# Patient Record
Sex: MALE | Race: WHITE | Employment: PART TIME | URBAN - NONMETROPOLITAN AREA
[De-identification: names, ages, dates, MRNs, and addresses within clinical notes are randomized per-mention and may not be internally consistent; named-entity substitution may affect disease eponyms.]

---

## 2019-11-08 ENCOUNTER — HOSPITAL ENCOUNTER (OUTPATIENT)
Age: 29
Discharge: HOME | End: 2019-11-08
Payer: OTHER GOVERNMENT

## 2019-11-08 DIAGNOSIS — Z00.00 WELLNESS EXAMINATION: ICD-10-CM

## 2019-11-08 LAB
ALBUMIN SERPL-MCNC: 4.3 G/DL (ref 3.5–5.2)
ALBUMIN/GLOBULIN RATIO: 1.5 (ref 1–2.5)
ALP BLD-CCNC: 47 U/L (ref 40–129)
ALT SERPL-CCNC: 8 U/L (ref 5–41)
ANION GAP SERPL CALCULATED.3IONS-SCNC: 9 MMOL/L (ref 9–17)
AST SERPL-CCNC: 15 U/L
BILIRUB SERPL-MCNC: 0.41 MG/DL (ref 0.3–1.2)
BUN BLDV-MCNC: 13 MG/DL (ref 6–20)
BUN/CREAT BLD: 13 (ref 9–20)
CALCIUM SERPL-MCNC: 9.6 MG/DL (ref 8.6–10.4)
CHLORIDE BLD-SCNC: 100 MMOL/L (ref 98–107)
CHOLESTEROL/HDL RATIO: 3.5
CHOLESTEROL: 144 MG/DL
CO2: 28 MMOL/L (ref 20–31)
CREAT SERPL-MCNC: 1 MG/DL (ref 0.7–1.2)
ESTIMATED AVERAGE GLUCOSE: 91 MG/DL
GFR AFRICAN AMERICAN: >60 ML/MIN
GFR NON-AFRICAN AMERICAN: >60 ML/MIN
GFR SERPL CREATININE-BSD FRML MDRD: NORMAL ML/MIN/{1.73_M2}
GFR SERPL CREATININE-BSD FRML MDRD: NORMAL ML/MIN/{1.73_M2}
GLUCOSE BLD-MCNC: 91 MG/DL (ref 70–99)
HBA1C MFR BLD: 4.8 % (ref 4.8–5.9)
HCT VFR BLD CALC: 43.6 % (ref 40.7–50.3)
HDLC SERPL-MCNC: 41 MG/DL
HEMOGLOBIN: 14.4 G/DL (ref 13–17)
LDL CHOLESTEROL: 85 MG/DL (ref 0–130)
MCH RBC QN AUTO: 27.8 PG (ref 25.2–33.5)
MCHC RBC AUTO-ENTMCNC: 33 G/DL (ref 28.4–34.8)
MCV RBC AUTO: 84.2 FL (ref 82.6–102.9)
NRBC AUTOMATED: 0 PER 100 WBC
PDW BLD-RTO: 12.4 % (ref 11.8–14.4)
PLATELET # BLD: 156 K/UL (ref 138–453)
PMV BLD AUTO: 12.1 FL (ref 8.1–13.5)
POTASSIUM SERPL-SCNC: 4.5 MMOL/L (ref 3.7–5.3)
RBC # BLD: 5.18 M/UL (ref 4.21–5.77)
SODIUM BLD-SCNC: 137 MMOL/L (ref 135–144)
TOTAL PROTEIN: 7.1 G/DL (ref 6.4–8.3)
TRIGL SERPL-MCNC: 92 MG/DL
VLDLC SERPL CALC-MCNC: NORMAL MG/DL (ref 1–30)
WBC # BLD: 3.8 K/UL (ref 3.5–11.3)

## 2019-11-08 PROCEDURE — 80053 COMPREHEN METABOLIC PANEL: CPT

## 2019-11-08 PROCEDURE — 85027 COMPLETE CBC AUTOMATED: CPT

## 2019-11-08 PROCEDURE — 83036 HEMOGLOBIN GLYCOSYLATED A1C: CPT

## 2019-11-08 PROCEDURE — 36415 COLL VENOUS BLD VENIPUNCTURE: CPT

## 2019-11-08 PROCEDURE — 80061 LIPID PANEL: CPT

## 2019-11-20 ENCOUNTER — OFFICE VISIT (OUTPATIENT)
Dept: UROLOGY | Age: 29
End: 2019-11-20
Payer: OTHER GOVERNMENT

## 2019-11-20 ENCOUNTER — HOSPITAL ENCOUNTER (OUTPATIENT)
Age: 29
Setting detail: SPECIMEN
Discharge: HOME | End: 2019-11-20
Payer: OTHER GOVERNMENT

## 2019-11-20 VITALS
HEIGHT: 72 IN | BODY MASS INDEX: 27.09 KG/M2 | SYSTOLIC BLOOD PRESSURE: 120 MMHG | WEIGHT: 200 LBS | DIASTOLIC BLOOD PRESSURE: 76 MMHG

## 2019-11-20 DIAGNOSIS — N48.9 LESION OF PENIS: ICD-10-CM

## 2019-11-20 DIAGNOSIS — N48.9 LESION OF PENIS: Primary | ICD-10-CM

## 2019-11-20 LAB
-: ABNORMAL
AMORPHOUS: ABNORMAL
BACTERIA: ABNORMAL
BILIRUBIN URINE: NEGATIVE
CASTS UA: ABNORMAL /LPF
COLOR: YELLOW
COMMENT UA: ABNORMAL
CRYSTALS, UA: ABNORMAL /HPF
EPITHELIAL CELLS UA: ABNORMAL /HPF (ref 0–5)
GLUCOSE URINE: NEGATIVE
KETONES, URINE: NEGATIVE
LEUKOCYTE ESTERASE, URINE: NEGATIVE
MUCUS: ABNORMAL
NITRITE, URINE: NEGATIVE
OTHER OBSERVATIONS UA: ABNORMAL
PH UA: 7.5 (ref 5–9)
PROTEIN UA: NEGATIVE
RBC UA: ABNORMAL /HPF (ref 0–2)
RENAL EPITHELIAL, UA: ABNORMAL /HPF
SPECIFIC GRAVITY UA: 1.01 (ref 1.01–1.02)
TRICHOMONAS: ABNORMAL
TURBIDITY: CLEAR
URINE HGB: NEGATIVE
UROBILINOGEN, URINE: NORMAL
WBC UA: ABNORMAL /HPF (ref 0–5)
YEAST: ABNORMAL

## 2019-11-20 PROCEDURE — 87086 URINE CULTURE/COLONY COUNT: CPT

## 2019-11-20 PROCEDURE — 99244 OFF/OP CNSLTJ NEW/EST MOD 40: CPT | Performed by: NURSE PRACTITIONER

## 2019-11-20 PROCEDURE — 81001 URINALYSIS AUTO W/SCOPE: CPT

## 2019-11-20 ASSESSMENT — ENCOUNTER SYMPTOMS
VOMITING: 0
COUGH: 0
WHEEZING: 0
BACK PAIN: 0
EYE REDNESS: 0
CONSTIPATION: 0
SHORTNESS OF BREATH: 0
EYE PAIN: 0
NAUSEA: 0
ABDOMINAL PAIN: 0
COLOR CHANGE: 0

## 2019-11-22 ENCOUNTER — TELEPHONE (OUTPATIENT)
Dept: UROLOGY | Age: 29
End: 2019-11-22

## 2019-11-22 LAB
CULTURE: NO GROWTH
Lab: NORMAL
SPECIMEN DESCRIPTION: NORMAL

## 2019-12-02 ENCOUNTER — ANESTHESIA EVENT (OUTPATIENT)
Dept: OPERATING ROOM | Age: 29
End: 2019-12-02
Payer: OTHER GOVERNMENT

## 2019-12-02 PROBLEM — A63.0 CONDYLOMA: Status: ACTIVE | Noted: 2019-12-02

## 2019-12-02 NOTE — H&P
History and Physical    Patient:  Ayo Emerson  MRN: [id number]    CHIEF COMPLAINT: Genital lesions    HISTORY OF PRESENT ILLNESS:   The patient is a 34 y.o. male who presents with genital lesions. New patient referral from Merari CARLISLE for penile lesions. He has had these lesions for several years. There is no discharge, itching, pain or change in the lesions. He has had new lesions develop. Visions are only to the shaft of his penis. He is . His wife is currently pregnant with their first child. He claims that his wife has the same lesions and her gynecologist said they are nothing to worry about. He does not know if he was vaccinated against HPV. He denies any lower urinary tract symptoms. Past Medical History:    History reviewed. No pertinent past medical history. Past Surgical History:    Past Surgical History:   Procedure Laterality Date    HERNIA REPAIR  2012       Medications Prior to Admission:    Prior to Admission medications    Medication Sig Start Date End Date Taking?  Authorizing Provider   fluticasone (FLONASE) 50 MCG/ACT nasal spray 1 spray by Each Nostril route 2 times daily as needed for Rhinitis 11/5/19   MAYLIN Dobbins - CNP   cetirizine (ZYRTEC ALLERGY) 10 MG tablet Take 10 mg by mouth daily Indications: takes PRN when needed in allergy season    Historical Provider, MD       Allergies:  Augmentin [amoxicillin-pot clavulanate]    Social History:    Social History     Socioeconomic History    Marital status:      Spouse name: Not on file    Number of children: Not on file    Years of education: Not on file    Highest education level: Not on file   Occupational History    Not on file   Social Needs    Financial resource strain: Not on file    Food insecurity:     Worry: Not on file     Inability: Not on file    Transportation needs:     Medical: Not on file     Non-medical: Not on file   Tobacco Use    Smoking status: Never Smoker    Smokeless tobacco: Former User     Types: Chew   Substance and Sexual Activity    Alcohol use: Yes     Comment: occasional    Drug use: No    Sexual activity: Not on file   Lifestyle    Physical activity:     Days per week: Not on file     Minutes per session: Not on file    Stress: Not on file   Relationships    Social connections:     Talks on phone: Not on file     Gets together: Not on file     Attends [employer] service: Not on file     Active member of club or organization: Not on file     Attends meetings of clubs or organizations: Not on file     Relationship status: Not on file    Intimate partner violence:     Fear of current or ex partner: Not on file     Emotionally abused: Not on file     Physically abused: Not on file     Forced sexual activity: Not on file   Other Topics Concern    Not on file   Social History Narrative    Not on file       Family History:    Family History   Problem Relation Age of Onset    No Known Problems Mother     Elevated Lipids Father     No Known Problems Sister     No Known Problems Brother        REVIEW OF SYSTEMS:  All systems reviewed and negative except for that already noted in the HPI. Physical Exam:      No data found. Constitutional: Patient in no acute distress; Neuro: alert and oriented to person place and time. Psych: Mood and affect normal.  Skin: Normal  Lungs: Respiratory effort normal  Cardiovascular:  Normal peripheral pulses  Abdomen: Soft, non-tender, non-distended with no CVA, flank pain, hepatosplenomegaly or hernia. Kidneys normal.  Bladder non-tender and not distended. Lymphatics: no palpable lymphadenopathy  Penis normal and with lesions  Urethral meatus normal  Scrotal exam normal  Testicles normal bilaterally  Epididymis normal bilaterally  No evidence of inguinal hernia    LABS:   No results for input(s): WBC, HGB, HCT, MCV, PLT in the last 72 hours.   No results for input(s): NA, K, CL, CO2, PHOS, BUN, CREATININE in the last

## 2019-12-03 ENCOUNTER — ANESTHESIA (OUTPATIENT)
Dept: OPERATING ROOM | Age: 29
End: 2019-12-03
Payer: OTHER GOVERNMENT

## 2020-01-28 ENCOUNTER — HOSPITAL ENCOUNTER (OUTPATIENT)
Age: 30
Setting detail: OUTPATIENT SURGERY
Discharge: HOME | End: 2020-01-28
Attending: UROLOGY | Admitting: UROLOGY
Payer: OTHER GOVERNMENT

## 2020-01-28 VITALS — DIASTOLIC BLOOD PRESSURE: 52 MMHG | SYSTOLIC BLOOD PRESSURE: 99 MMHG | OXYGEN SATURATION: 97 % | TEMPERATURE: 98.6 F

## 2020-01-28 VITALS
RESPIRATION RATE: 18 BRPM | TEMPERATURE: 97.5 F | SYSTOLIC BLOOD PRESSURE: 98 MMHG | OXYGEN SATURATION: 97 % | HEART RATE: 44 BPM | HEIGHT: 72 IN | WEIGHT: 200 LBS | DIASTOLIC BLOOD PRESSURE: 59 MMHG | BODY MASS INDEX: 27.09 KG/M2

## 2020-01-28 PROCEDURE — 2709999900 HC NON-CHARGEABLE SUPPLY: Performed by: UROLOGY

## 2020-01-28 PROCEDURE — 3600000003 HC SURGERY LEVEL 3 BASE: Performed by: UROLOGY

## 2020-01-28 PROCEDURE — 3600000013 HC SURGERY LEVEL 3 ADDTL 15MIN: Performed by: UROLOGY

## 2020-01-28 PROCEDURE — 3700000000 HC ANESTHESIA ATTENDED CARE: Performed by: UROLOGY

## 2020-01-28 PROCEDURE — 2500000003 HC RX 250 WO HCPCS: Performed by: NURSE ANESTHETIST, CERTIFIED REGISTERED

## 2020-01-28 PROCEDURE — 6360000002 HC RX W HCPCS: Performed by: NURSE ANESTHETIST, CERTIFIED REGISTERED

## 2020-01-28 PROCEDURE — 6370000000 HC RX 637 (ALT 250 FOR IP): Performed by: UROLOGY

## 2020-01-28 PROCEDURE — 7100000010 HC PHASE II RECOVERY - FIRST 15 MIN: Performed by: UROLOGY

## 2020-01-28 PROCEDURE — 2500000003 HC RX 250 WO HCPCS: Performed by: UROLOGY

## 2020-01-28 PROCEDURE — 2580000003 HC RX 258: Performed by: NURSE ANESTHETIST, CERTIFIED REGISTERED

## 2020-01-28 PROCEDURE — 7100000011 HC PHASE II RECOVERY - ADDTL 15 MIN: Performed by: UROLOGY

## 2020-01-28 PROCEDURE — 3700000001 HC ADD 15 MINUTES (ANESTHESIA): Performed by: UROLOGY

## 2020-01-28 PROCEDURE — 6360000002 HC RX W HCPCS: Performed by: UROLOGY

## 2020-01-28 PROCEDURE — 88305 TISSUE EXAM BY PATHOLOGIST: CPT

## 2020-01-28 PROCEDURE — 2580000003 HC RX 258: Performed by: UROLOGY

## 2020-01-28 RX ORDER — PROMETHAZINE HYDROCHLORIDE 25 MG/ML
6.25 INJECTION, SOLUTION INTRAMUSCULAR; INTRAVENOUS
Status: DISCONTINUED | OUTPATIENT
Start: 2020-01-28 | End: 2020-01-28 | Stop reason: HOSPADM

## 2020-01-28 RX ORDER — SODIUM CHLORIDE 0.9 % (FLUSH) 0.9 %
10 SYRINGE (ML) INJECTION EVERY 12 HOURS SCHEDULED
Status: DISCONTINUED | OUTPATIENT
Start: 2020-01-28 | End: 2020-01-28 | Stop reason: SDUPTHER

## 2020-01-28 RX ORDER — SODIUM CHLORIDE 0.9 % (FLUSH) 0.9 %
10 SYRINGE (ML) INJECTION EVERY 12 HOURS SCHEDULED
Status: DISCONTINUED | OUTPATIENT
Start: 2020-01-28 | End: 2020-01-28 | Stop reason: HOSPADM

## 2020-01-28 RX ORDER — BACITRACIN, NEOMYCIN, POLYMYXIN B 400; 3.5; 5 [USP'U]/G; MG/G; [USP'U]/G
OINTMENT TOPICAL PRN
Status: DISCONTINUED | OUTPATIENT
Start: 2020-01-28 | End: 2020-01-28 | Stop reason: ALTCHOICE

## 2020-01-28 RX ORDER — HYDROCODONE BITARTRATE AND ACETAMINOPHEN 5; 325 MG/1; MG/1
2 TABLET ORAL
Status: DISCONTINUED | OUTPATIENT
Start: 2020-01-28 | End: 2020-01-28 | Stop reason: HOSPADM

## 2020-01-28 RX ORDER — MIDAZOLAM HYDROCHLORIDE 1 MG/ML
INJECTION INTRAMUSCULAR; INTRAVENOUS PRN
Status: DISCONTINUED | OUTPATIENT
Start: 2020-01-28 | End: 2020-01-28 | Stop reason: SDUPTHER

## 2020-01-28 RX ORDER — LIDOCAINE HYDROCHLORIDE 20 MG/ML
INJECTION, SOLUTION EPIDURAL; INFILTRATION; INTRACAUDAL; PERINEURAL PRN
Status: DISCONTINUED | OUTPATIENT
Start: 2020-01-28 | End: 2020-01-28 | Stop reason: SDUPTHER

## 2020-01-28 RX ORDER — CEFAZOLIN SODIUM 2 G/50ML
2 SOLUTION INTRAVENOUS
Status: COMPLETED | OUTPATIENT
Start: 2020-01-28 | End: 2020-01-28

## 2020-01-28 RX ORDER — SODIUM CHLORIDE, SODIUM LACTATE, POTASSIUM CHLORIDE, CALCIUM CHLORIDE 600; 310; 30; 20 MG/100ML; MG/100ML; MG/100ML; MG/100ML
INJECTION, SOLUTION INTRAVENOUS CONTINUOUS
Status: DISCONTINUED | OUTPATIENT
Start: 2020-01-28 | End: 2020-01-28 | Stop reason: SDUPTHER

## 2020-01-28 RX ORDER — DIMENHYDRINATE 50 MG/1
50 TABLET ORAL ONCE
Status: DISCONTINUED | OUTPATIENT
Start: 2020-01-28 | End: 2020-01-28 | Stop reason: SDUPTHER

## 2020-01-28 RX ORDER — METOCLOPRAMIDE HYDROCHLORIDE 5 MG/ML
10 INJECTION INTRAMUSCULAR; INTRAVENOUS
Status: DISCONTINUED | OUTPATIENT
Start: 2020-01-28 | End: 2020-01-28 | Stop reason: HOSPADM

## 2020-01-28 RX ORDER — SODIUM CHLORIDE, SODIUM LACTATE, POTASSIUM CHLORIDE, CALCIUM CHLORIDE 600; 310; 30; 20 MG/100ML; MG/100ML; MG/100ML; MG/100ML
INJECTION, SOLUTION INTRAVENOUS CONTINUOUS
Status: DISCONTINUED | OUTPATIENT
Start: 2020-01-28 | End: 2020-01-28 | Stop reason: HOSPADM

## 2020-01-28 RX ORDER — PROPOFOL 10 MG/ML
INJECTION, EMULSION INTRAVENOUS PRN
Status: DISCONTINUED | OUTPATIENT
Start: 2020-01-28 | End: 2020-01-28 | Stop reason: SDUPTHER

## 2020-01-28 RX ORDER — SODIUM CHLORIDE 0.9 % (FLUSH) 0.9 %
10 SYRINGE (ML) INJECTION PRN
Status: DISCONTINUED | OUTPATIENT
Start: 2020-01-28 | End: 2020-01-28 | Stop reason: HOSPADM

## 2020-01-28 RX ORDER — LIDOCAINE HYDROCHLORIDE 10 MG/ML
INJECTION, SOLUTION EPIDURAL; INFILTRATION; INTRACAUDAL; PERINEURAL PRN
Status: DISCONTINUED | OUTPATIENT
Start: 2020-01-28 | End: 2020-01-28 | Stop reason: ALTCHOICE

## 2020-01-28 RX ORDER — SODIUM CHLORIDE, SODIUM LACTATE, POTASSIUM CHLORIDE, CALCIUM CHLORIDE 600; 310; 30; 20 MG/100ML; MG/100ML; MG/100ML; MG/100ML
INJECTION, SOLUTION INTRAVENOUS CONTINUOUS PRN
Status: DISCONTINUED | OUTPATIENT
Start: 2020-01-28 | End: 2020-01-28 | Stop reason: SDUPTHER

## 2020-01-28 RX ORDER — DIMENHYDRINATE 50 MG/1
50 TABLET ORAL ONCE
Status: COMPLETED | OUTPATIENT
Start: 2020-01-28 | End: 2020-01-28

## 2020-01-28 RX ORDER — FENTANYL CITRATE 50 UG/ML
50 INJECTION, SOLUTION INTRAMUSCULAR; INTRAVENOUS EVERY 5 MIN PRN
Status: DISCONTINUED | OUTPATIENT
Start: 2020-01-28 | End: 2020-01-28 | Stop reason: HOSPADM

## 2020-01-28 RX ORDER — ACETAMINOPHEN 325 MG/1
650 TABLET ORAL ONCE
Status: COMPLETED | OUTPATIENT
Start: 2020-01-28 | End: 2020-01-28

## 2020-01-28 RX ORDER — KETAMINE HYDROCHLORIDE 100 MG/ML
INJECTION, SOLUTION INTRAMUSCULAR; INTRAVENOUS PRN
Status: DISCONTINUED | OUTPATIENT
Start: 2020-01-28 | End: 2020-01-28 | Stop reason: SDUPTHER

## 2020-01-28 RX ORDER — ACETAMINOPHEN 325 MG/1
650 TABLET ORAL ONCE
Status: DISCONTINUED | OUTPATIENT
Start: 2020-01-28 | End: 2020-01-28 | Stop reason: SDUPTHER

## 2020-01-28 RX ADMIN — KETAMINE HYDROCHLORIDE 5 MG: 100 INJECTION INTRAMUSCULAR; INTRAVENOUS at 07:24

## 2020-01-28 RX ADMIN — PROPOFOL 30 MG: 10 INJECTION, EMULSION INTRAVENOUS at 07:24

## 2020-01-28 RX ADMIN — PROPOFOL 30 MG: 10 INJECTION, EMULSION INTRAVENOUS at 07:35

## 2020-01-28 RX ADMIN — LIDOCAINE HYDROCHLORIDE 100 MG: 20 INJECTION, SOLUTION EPIDURAL; INFILTRATION; INTRACAUDAL at 07:21

## 2020-01-28 RX ADMIN — PROPOFOL 30 MG: 10 INJECTION, EMULSION INTRAVENOUS at 07:28

## 2020-01-28 RX ADMIN — SODIUM CHLORIDE, POTASSIUM CHLORIDE, SODIUM LACTATE AND CALCIUM CHLORIDE: 600; 310; 30; 20 INJECTION, SOLUTION INTRAVENOUS at 07:16

## 2020-01-28 RX ADMIN — DIMENHYDRINATE 50 MG: 50 TABLET ORAL at 06:59

## 2020-01-28 RX ADMIN — MIDAZOLAM 2 MG: 1 INJECTION INTRAMUSCULAR; INTRAVENOUS at 07:17

## 2020-01-28 RX ADMIN — SODIUM CHLORIDE, POTASSIUM CHLORIDE, SODIUM LACTATE AND CALCIUM CHLORIDE: 600; 310; 30; 20 INJECTION, SOLUTION INTRAVENOUS at 06:58

## 2020-01-28 RX ADMIN — PROPOFOL 50 MG: 10 INJECTION, EMULSION INTRAVENOUS at 07:21

## 2020-01-28 RX ADMIN — ACETAMINOPHEN 650 MG: 325 TABLET, FILM COATED ORAL at 06:59

## 2020-01-28 RX ADMIN — CEFAZOLIN SODIUM 2 G: 2 SOLUTION INTRAVENOUS at 07:15

## 2020-01-28 RX ADMIN — KETAMINE HYDROCHLORIDE 10 MG: 100 INJECTION INTRAMUSCULAR; INTRAVENOUS at 07:20

## 2020-01-28 RX ADMIN — MIDAZOLAM 2 MG: 1 INJECTION INTRAMUSCULAR; INTRAVENOUS at 07:12

## 2020-01-28 ASSESSMENT — PAIN SCALES - GENERAL
PAINLEVEL_OUTOF10: 0

## 2020-01-28 ASSESSMENT — PULMONARY FUNCTION TESTS
PIF_VALUE: 0
PIF_VALUE: 1
PIF_VALUE: 0
PIF_VALUE: 1
PIF_VALUE: 1
PIF_VALUE: 0
PIF_VALUE: 1
PIF_VALUE: 0
PIF_VALUE: 0
PIF_VALUE: 1
PIF_VALUE: 0
PIF_VALUE: 1
PIF_VALUE: 0
PIF_VALUE: 1
PIF_VALUE: 1
PIF_VALUE: 0

## 2020-01-28 ASSESSMENT — PAIN - FUNCTIONAL ASSESSMENT: PAIN_FUNCTIONAL_ASSESSMENT: 0-10

## 2020-01-28 NOTE — INTERVAL H&P NOTE
needs:       Medical: Not on file       Non-medical: Not on file   Tobacco Use    Smoking status: Never Smoker    Smokeless tobacco: Former User       Types: Chew   Substance and Sexual Activity    Alcohol use: Yes       Comment: occasional    Drug use: No    Sexual activity: Not on file   Lifestyle    Physical activity:       Days per week: Not on file       Minutes per session: Not on file    Stress: Not on file   Relationships    Social connections:       Talks on phone: Not on file       Gets together: Not on file       Attends Tenriism service: Not on file       Active member of club or organization: Not on file       Attends meetings of clubs or organizations: Not on file       Relationship status: Not on file    Intimate partner violence:       Fear of current or ex partner: Not on file       Emotionally abused: Not on file       Physically abused: Not on file       Forced sexual activity: Not on file   Other Topics Concern    Not on file   Social History Narrative    Not on file            Family History:    Family History         Family History   Problem Relation Age of Onset    No Known Problems Mother      Elevated Lipids Father      No Known Problems Sister      No Known Problems Brother              REVIEW OF SYSTEMS:  All systems reviewed and negative except for that already noted in the HPI.     Physical Exam:       No data found. Constitutional: Patient in no acute distress; Neuro: alert and oriented to person place and time. Psych: Mood and affect normal.  Skin: Normal  Lungs: Respiratory effort normal  Cardiovascular:  Normal peripheral pulses  Abdomen: Soft, non-tender, non-distended with no CVA, flank pain, hepatosplenomegaly or hernia. Kidneys normal.  Bladder non-tender and not distended.   Lymphatics: no palpable lymphadenopathy  Penis normal and with lesions  Urethral meatus normal  Scrotal exam normal  Testicles normal bilaterally  Epididymis normal bilaterally  No evidence of inguinal hernia     LABS:   No results for input(s): WBC, HGB, HCT, MCV, PLT in the last 72 hours.   No results for input(s): NA, K, CL, CO2, PHOS, BUN, CREATININE in the last 72 hours.     Invalid input(s): CA  No results found for: PSA     Additional Lab/culture results:     Urinalysis: No results for input(s): COLORU, PHUR, LABCAST, WBCUA, RBCUA, MUCUS, TRICHOMONAS, YEAST, BACTERIA, CLARITYU, SPECGRAV, LEUKOCYTESUR, UROBILINOGEN, BILIRUBINUR, BLOODU in the last 72 hours.     Invalid input(s): NITRATE, GLUCOSEUKETONESUAMORPHOUS      -----------------------------------------------------------------  Imaging Results:     Assessment and Plan   Impression:  There is no problem list on file for this patient.        Plan: CO2 laser ablation of condyloma

## 2020-01-28 NOTE — ANESTHESIA PRE PROCEDURE
Laterality Date    HERNIA REPAIR  2012       Social History:    Social History     Tobacco Use    Smoking status: Never Smoker    Smokeless tobacco: Former User     Types: Chew   Substance Use Topics    Alcohol use: Yes     Comment: occasional                                Counseling given: Not Answered      Vital Signs (Current):   Vitals:    11/21/19 0917 01/16/20 0951 01/28/20 0643   BP:   126/74   Pulse:   61   Resp:   18   Temp:   36.2 °C (97.1 °F)   TempSrc:   Temporal   SpO2:   100%   Weight: 200 lb (90.7 kg) 200 lb (90.7 kg) 200 lb (90.7 kg)   Height: 6' (1.829 m) 6' (1.829 m) 6' (1.829 m)                                              BP Readings from Last 3 Encounters:   01/28/20 126/74   11/20/19 120/76   11/05/19 (!) 108/56       NPO Status: Time of last liquid consumption: 2330                        Time of last solid consumption: 2000                        Date of last liquid consumption: 01/27/20                        Date of last solid food consumption: 01/27/20    BMI:   Wt Readings from Last 3 Encounters:   01/28/20 200 lb (90.7 kg)   11/20/19 200 lb (90.7 kg)   11/05/19 191 lb (86.6 kg)     Body mass index is 27.12 kg/m². CBC:   Lab Results   Component Value Date    WBC 3.8 11/08/2019    RBC 5.18 11/08/2019    HGB 14.4 11/08/2019    HCT 43.6 11/08/2019    MCV 84.2 11/08/2019    RDW 12.4 11/08/2019     11/08/2019       CMP:   Lab Results   Component Value Date     11/08/2019    K 4.5 11/08/2019     11/08/2019    CO2 28 11/08/2019    BUN 13 11/08/2019    CREATININE 1.00 11/08/2019    GFRAA >60 11/08/2019    LABGLOM >60 11/08/2019    GLUCOSE 91 11/08/2019    PROT 7.1 11/08/2019    CALCIUM 9.6 11/08/2019    BILITOT 0.41 11/08/2019    ALKPHOS 47 11/08/2019    AST 15 11/08/2019    ALT 8 11/08/2019       POC Tests: No results for input(s): POCGLU, POCNA, POCK, POCCL, POCBUN, POCHEMO, POCHCT in the last 72 hours.     Coags: No results found for: PROTIME, INR, APTT    HCG (If

## 2020-01-28 NOTE — PROGRESS NOTES
0750 no drainage noted on penis. Discharge instructions given to pt and wife. 0850 small amt red drainage on penis. Area covered with telfa dressing. Discharge Criteria    Inpatients must meet Criteria 1 through 7. All other patients are either YES or N/A. If a NO is chosen then Anesthesia or Surgeon must be notified. 1.  Minimum 30 minutes after last dose of sedative medication, minimum 120 minutes after last dose of reversal agent. Yes      2. Systolic BP stable within 20 mmHg for 30 minutes & systolic BP between 90 & 527 or within 10 mmHg of baseline. Yes      3. Pulse between 60 and 100 or within 10 bpm of baseline. Yes      4. Spontaneous respiratory rate >/= 10 per minute. Yes      5. SaO2 >/= 95 or  >/= baseline. Yes      6. Able to cough and swallow or return to baseline function. Yes      7. Alert and oriented or return to baseline mental status. Yes      8. Demonstrates controlled, coordinated movements, ambulates with steady gait, or return to baseline activity function. Yes      9. Minimal or no pain or nausea, or at a level tolerable and acceptable to patient. Yes      10. Takes and retains oral fluids as allowed. Yes      11. Procedural / perioperative site stable. Minimal or no bleeding. Yes          12. If GI endoscopy procedure, minimal or no abdominal distention or passing flatus. N/A      13. Written discharge instructions and emergency telephone number provided. Yes      14. Accompanied by a responsible adult.     Yes      Adult patient discharged from facility without responsible person meets above criteria plus the following:   a) remains awake without stimulus for 30 minutes     b) oriented appropriate for age      c) all vital signs stable   d) no significant risk of losing protective reflexes      e) able to maintain pre-procedure mobility without assistance   f) no nausea or dizziness      g) transportation arrangements that do not require patient to operate motor Vehicle.      N/A

## 2020-01-28 NOTE — PROGRESS NOTES
3544 pt dressing to go home. States sleepy and has not slept much at home because of  at home. Ready to go home.

## 2020-01-29 LAB — SURGICAL PATHOLOGY REPORT: NORMAL

## 2020-01-29 NOTE — OP NOTE
[address], [place] [address]                                OPERATIVE REPORT    PATIENT NAME: Umu Blevins                   :        1990  MED REC NO:   [id number]                              ROOM:  ACCOUNT NO:   [de-identified]                           ADMIT DATE: 2020  PROVIDER:     Michell Quezada    DATE OF PROCEDURE:  2020    SURGEON:  Dr. Michell Quezada. ASSISTANT:  None. PREOPERATIVE DIAGNOSIS:  Penile lesion. POSTOPERATIVE DIAGNOSIS:  Penile lesion. PROCEDURE PERFORMED:  1. Excision and biopsy of penile lesion. 2.  CO2 laser ablation of penile condyloma. ANESTHESIA:  General.    COMPLICATIONS:  None. ESTIMATED BLOOD LOSS:  Minimal.    SPECIMENS:  Penile lesion. DISPOSITION:  Stable. FINDINGS:  Multipole 3 mm lesions on the dorsum of the mid shaft of the  penis. INDICATIONS:  The patient is a 66-year-old male with known penile  lesions, here now for biopsy and ablation. DESCRIPTION OF PROCEDURE:  The patient was taken back to the operating  room after informed consent including all risks, benefits, and  alternatives were obtained. The patient was transferred from the [facility]  onto the operative table where he was induced under general anesthesia,  given IV Ancef for preoperative antibiotic prophylaxis. To begin the  case, he was prepped and draped in normal sterile fashion, placed in the  supine position. To begin the case, we identified a lesion on the  dorsum of the penis. We were able to excise this using a 15 blade  scalpel. This was passed to the back table for permanent analysis. We  were then able to cauterize the area with Bovie cautery. At this point  in time, We introduced the CO2 laser and we then ablated the rest of the  lesions with the CO2 laser after the area was anesthetized with 1%  lidocaine. We did do this several time removing the eschar in between.

## 2020-02-05 ENCOUNTER — OFFICE VISIT (OUTPATIENT)
Dept: UROLOGY | Age: 30
End: 2020-02-05
Payer: OTHER GOVERNMENT

## 2020-02-05 VITALS — WEIGHT: 197 LBS | BODY MASS INDEX: 26.72 KG/M2 | SYSTOLIC BLOOD PRESSURE: 100 MMHG | DIASTOLIC BLOOD PRESSURE: 60 MMHG

## 2020-02-05 PROCEDURE — 99024 POSTOP FOLLOW-UP VISIT: CPT | Performed by: UROLOGY

## 2020-02-05 ASSESSMENT — ENCOUNTER SYMPTOMS
EYE REDNESS: 0
SHORTNESS OF BREATH: 0
WHEEZING: 0
NAUSEA: 0
COLOR CHANGE: 0
VOMITING: 0
COUGH: 0
BACK PAIN: 0
ABDOMINAL PAIN: 0
EYE PAIN: 0

## 2020-06-23 ENCOUNTER — HOSPITAL ENCOUNTER (OUTPATIENT)
Age: 30
Discharge: HOME | End: 2020-06-25
Payer: OTHER GOVERNMENT

## 2020-06-23 ENCOUNTER — HOSPITAL ENCOUNTER (OUTPATIENT)
Dept: GENERAL RADIOLOGY | Age: 30
Discharge: HOME | End: 2020-06-25
Payer: OTHER GOVERNMENT

## 2020-06-23 PROCEDURE — 73610 X-RAY EXAM OF ANKLE: CPT

## 2020-11-27 ENCOUNTER — HOSPITAL ENCOUNTER (OUTPATIENT)
Dept: LAB | Age: 30
Setting detail: SPECIMEN
Discharge: HOME | End: 2020-11-27
Payer: OTHER GOVERNMENT

## 2020-11-27 PROCEDURE — U0003 INFECTIOUS AGENT DETECTION BY NUCLEIC ACID (DNA OR RNA); SEVERE ACUTE RESPIRATORY SYNDROME CORONAVIRUS 2 (SARS-COV-2) (CORONAVIRUS DISEASE [COVID-19]), AMPLIFIED PROBE TECHNIQUE, MAKING USE OF HIGH THROUGHPUT TECHNOLOGIES AS DESCRIBED BY CMS-2020-01-R: HCPCS

## 2020-11-27 PROCEDURE — C9803 HOPD COVID-19 SPEC COLLECT: HCPCS

## 2020-11-29 LAB — SARS-COV-2, NAA: DETECTED

## 2020-11-30 ENCOUNTER — TELEPHONE (OUTPATIENT)
Dept: PRIMARY CARE CLINIC | Age: 30
End: 2020-11-30

## 2020-11-30 ENCOUNTER — TELEPHONE (OUTPATIENT)
Dept: ADMINISTRATIVE | Age: 30
End: 2020-11-30

## 2023-10-06 PROBLEM — J45.909 ASTHMA: Status: ACTIVE | Noted: 2023-10-06

## 2023-10-09 ENCOUNTER — HOSPITAL ENCOUNTER (OUTPATIENT)
Dept: GENERAL RADIOLOGY | Age: 33
Discharge: HOME | End: 2023-10-11
Payer: OTHER GOVERNMENT

## 2023-10-09 ENCOUNTER — HOSPITAL ENCOUNTER (OUTPATIENT)
Age: 33
Discharge: HOME | End: 2023-10-11
Payer: OTHER GOVERNMENT

## 2023-10-09 ENCOUNTER — HOSPITAL ENCOUNTER (OUTPATIENT)
Age: 33
Discharge: HOME | End: 2023-10-09
Payer: OTHER GOVERNMENT

## 2023-10-09 DIAGNOSIS — M25.511 CHRONIC RIGHT SHOULDER PAIN: ICD-10-CM

## 2023-10-09 DIAGNOSIS — K59.01 SLOW TRANSIT CONSTIPATION: ICD-10-CM

## 2023-10-09 DIAGNOSIS — G89.29 CHRONIC RIGHT SHOULDER PAIN: ICD-10-CM

## 2023-10-09 DIAGNOSIS — M54.6 THORACIC SPINE PAIN: ICD-10-CM

## 2023-10-09 LAB
ALBUMIN SERPL-MCNC: 4.7 G/DL (ref 3.5–5.2)
ALBUMIN/GLOB SERPL: 1.7 {RATIO} (ref 1–2.5)
ALP SERPL-CCNC: 52 U/L (ref 40–129)
ALT SERPL-CCNC: 22 U/L (ref 5–41)
ANION GAP SERPL CALCULATED.3IONS-SCNC: 9 MMOL/L (ref 9–17)
AST SERPL-CCNC: 25 U/L
BILIRUB SERPL-MCNC: 0.7 MG/DL (ref 0.3–1.2)
BUN SERPL-MCNC: 15 MG/DL (ref 6–20)
BUN/CREAT SERPL: 14 (ref 9–20)
CALCIUM SERPL-MCNC: 9.5 MG/DL (ref 8.6–10.4)
CHLORIDE SERPL-SCNC: 104 MMOL/L (ref 98–107)
CHOLEST SERPL-MCNC: 196 MG/DL (ref 0–199)
CHOLESTEROL/HDL RATIO: 5
CO2 SERPL-SCNC: 28 MMOL/L (ref 20–31)
CREAT SERPL-MCNC: 1.1 MG/DL (ref 0.7–1.2)
ERYTHROCYTE [DISTWIDTH] IN BLOOD BY AUTOMATED COUNT: 12.3 % (ref 11.8–14.4)
GFR SERPL CREATININE-BSD FRML MDRD: >60 ML/MIN/1.73M2
GLUCOSE SERPL-MCNC: 92 MG/DL (ref 70–99)
HCT VFR BLD AUTO: 46.1 % (ref 40.7–50.3)
HDLC SERPL-MCNC: 42 MG/DL
HGB BLD-MCNC: 15.3 G/DL (ref 13–17)
LDLC SERPL CALC-MCNC: 137 MG/DL (ref 0–100)
MCH RBC QN AUTO: 28.5 PG (ref 25.2–33.5)
MCHC RBC AUTO-ENTMCNC: 33.2 G/DL (ref 28.4–34.8)
MCV RBC AUTO: 85.8 FL (ref 82.6–102.9)
NRBC BLD-RTO: 0 PER 100 WBC
PLATELET # BLD AUTO: 145 K/UL (ref 138–453)
PMV BLD AUTO: 11.9 FL (ref 8.1–13.5)
POTASSIUM SERPL-SCNC: 4.5 MMOL/L (ref 3.7–5.3)
PROT SERPL-MCNC: 7.5 G/DL (ref 6.4–8.3)
RBC # BLD AUTO: 5.37 M/UL (ref 4.21–5.77)
SODIUM SERPL-SCNC: 141 MMOL/L (ref 135–144)
TRIGL SERPL-MCNC: 85 MG/DL (ref 0–149)
VLDLC SERPL CALC-MCNC: 17 MG/DL
WBC OTHER # BLD: 4.3 K/UL (ref 3.5–11.3)

## 2023-10-09 PROCEDURE — 36415 COLL VENOUS BLD VENIPUNCTURE: CPT

## 2023-10-09 PROCEDURE — 85027 COMPLETE CBC AUTOMATED: CPT

## 2023-10-09 PROCEDURE — 80061 LIPID PANEL: CPT

## 2023-10-09 PROCEDURE — 72072 X-RAY EXAM THORAC SPINE 3VWS: CPT

## 2023-10-09 PROCEDURE — 73030 X-RAY EXAM OF SHOULDER: CPT

## 2023-10-09 PROCEDURE — 80053 COMPREHEN METABOLIC PANEL: CPT

## 2023-12-04 ENCOUNTER — OFFICE VISIT (OUTPATIENT)
Dept: UROLOGY | Age: 33
End: 2023-12-04
Payer: OTHER GOVERNMENT

## 2023-12-04 VITALS
DIASTOLIC BLOOD PRESSURE: 76 MMHG | WEIGHT: 214 LBS | BODY MASS INDEX: 29.02 KG/M2 | SYSTOLIC BLOOD PRESSURE: 114 MMHG | HEART RATE: 76 BPM | TEMPERATURE: 98.9 F

## 2023-12-04 DIAGNOSIS — A63.0 CONDYLOMA: Primary | ICD-10-CM

## 2023-12-04 PROCEDURE — 99204 OFFICE O/P NEW MOD 45 MIN: CPT | Performed by: UROLOGY

## 2023-12-04 RX ORDER — SILDENAFIL 50 MG/1
TABLET, FILM COATED ORAL
COMMUNITY
Start: 2023-11-11

## 2023-12-04 ASSESSMENT — ENCOUNTER SYMPTOMS
COUGH: 0
ABDOMINAL PAIN: 0
SHORTNESS OF BREATH: 0
WHEEZING: 0
EYE REDNESS: 0
VOMITING: 0
BACK PAIN: 0
NAUSEA: 0
CONSTIPATION: 0
COLOR CHANGE: 0

## 2023-12-04 NOTE — PROGRESS NOTES
HPI:          Patient is a 35 y.o. male in no acute distress. He is alert and oriented to person, place, and time. History  New patient referral from Javy CARLISLE for penile lesions. He has had these lesions for several years. There is no discharge, itching, pain or change in the lesions. He has had new lesions develop. Visions are only to the shaft of his penis. He is . His wife is currently pregnant with their first child. He claims that his wife has the same lesions and her gynecologist said they are nothing to worry about. He does not know if he was vaccinated against HPV. He denies any lower urinary tract symptoms. 1/2020 CO2 laser ablation  Path condyloma accuminatum     Currently  Patient is here today for penile lesions. Patient does have a history of having CO2 laser ablation of condyloma approximately 3 years ago. Patient does report having a lesion for approximately 1 year. This is on the top of his penis. Patient is having no other significant lower urinary tract symptoms. He has no issues with constipation. Reports no pain today  Past Medical History:   Diagnosis Date    Chronic allergic rhinitis      Past Surgical History:   Procedure Laterality Date    HERNIA REPAIR  2012    PENIS BIOPSY      condyloma ablation    PENIS SURGERY N/A 01/28/2020    PENIS CONDYLOMA ABLATION LASER-CO2 LASER AND BIOPSY performed by Doris Padilla MD at [address] [facility] [address]       Outpatient Encounter Medications as of 12/4/2023   Medication Sig Dispense Refill    sildenafil (VIAGRA) 50 MG tablet       flunisolide (NASALIDE) 25 MCG/ACT (0.025%) SOLN 1 spray by Nasal route daily      montelukast (SINGULAIR) 10 MG tablet Take 1 tablet by mouth nightly      Azelastine HCl 137 MCG/SPRAY SOLN 1 spray by Each Nostril route daily       No facility-administered encounter medications on file as of 12/4/2023.       Current Outpatient Medications on File Prior to Visit   Medication Sig

## 2023-12-18 NOTE — PROGRESS NOTES
Patient instructed on the pre-operative, intra-operative, and post-operative process. Patient instructed on NPO status. Medication instructions and pre operative instruction sheet reviewed with the patient.

## 2024-01-01 NOTE — H&P
History and Physical    Patient:  Adilson Roy  MRN: [id number]    CHIEF COMPLAINT:  penile lesion    HISTORY OF PRESENT ILLNESS:   The patient is a 33 y.o. male who presents with penile lesion. History  New patient referral from KELVIN CARLISLE for penile lesions. He has had these lesions for several years. There is no discharge, itching, pain or change in the lesions.  He has had new lesions develop.  Visions are only to the shaft of his penis.  He is .  His wife is currently pregnant with their first child.  He claims that his wife has the same lesions and her gynecologist said they are nothing to worry about.  He does not know if he was vaccinated against HPV.  He denies any lower urinary tract symptoms.     1/2020 CO2 laser ablation  Path condyloma accuminatum     Currently  Patient is here today for penile lesions.  Patient does have a history of having CO2 laser ablation of condyloma approximately 3 years ago.  Patient does report having a lesion for approximately 1 year.  This is on the top of his penis.  Patient is having no other significant lower urinary tract symptoms.  He has no issues with constipation.  Reports no pain today    Past Medical History:    Past Medical History:   Diagnosis Date    Chronic allergic rhinitis        Past Surgical History:    Past Surgical History:   Procedure Laterality Date    HERNIA REPAIR  2012    PENIS BIOPSY      condyloma ablation    PENIS SURGERY N/A 01/28/2020    PENIS CONDYLOMA ABLATION LASER-CO2 LASER AND BIOPSY performed by Franky Bella MD at [facility] OR    RHINOPLASTY         Medications Prior to Admission:    Prior to Admission medications    Medication Sig Start Date End Date Taking? Authorizing Provider   sildenafil (VIAGRA) 50 MG tablet  11/11/23   Rhoda Mederos MD   flunisolide (NASALIDE) 25 MCG/ACT (0.025%) SOLN 1 spray by Nasal route daily 4/1/21   Rhoda Mederos MD   montelukast (SINGULAIR) 10 MG tablet Take 1 tablet by mouth

## 2024-01-02 ENCOUNTER — HOSPITAL ENCOUNTER (OUTPATIENT)
Age: 34
Setting detail: OUTPATIENT SURGERY
Discharge: HOME | End: 2024-01-02
Attending: UROLOGY | Admitting: UROLOGY
Payer: OTHER GOVERNMENT

## 2024-01-02 VITALS
RESPIRATION RATE: 18 BRPM | HEART RATE: 62 BPM | WEIGHT: 214 LBS | SYSTOLIC BLOOD PRESSURE: 134 MMHG | BODY MASS INDEX: 28.99 KG/M2 | DIASTOLIC BLOOD PRESSURE: 52 MMHG | OXYGEN SATURATION: 100 % | TEMPERATURE: 97 F | HEIGHT: 72 IN

## 2024-01-02 PROCEDURE — 2709999900 HC NON-CHARGEABLE SUPPLY: Performed by: UROLOGY

## 2024-01-02 PROCEDURE — 2500000003 HC RX 250 WO HCPCS: Performed by: UROLOGY

## 2024-01-02 PROCEDURE — 3600000003 HC SURGERY LEVEL 3 BASE: Performed by: UROLOGY

## 2024-01-02 PROCEDURE — 3600000013 HC SURGERY LEVEL 3 ADDTL 15MIN: Performed by: UROLOGY

## 2024-01-02 PROCEDURE — 6370000000 HC RX 637 (ALT 250 FOR IP): Performed by: UROLOGY

## 2024-01-02 RX ORDER — LIDOCAINE HYDROCHLORIDE 10 MG/ML
INJECTION, SOLUTION EPIDURAL; INFILTRATION; INTRACAUDAL; PERINEURAL PRN
Status: DISCONTINUED | OUTPATIENT
Start: 2024-01-02 | End: 2024-01-02 | Stop reason: ALTCHOICE

## 2024-01-02 RX ORDER — IBUPROFEN 200 MG
TABLET ORAL PRN
Status: DISCONTINUED | OUTPATIENT
Start: 2024-01-02 | End: 2024-01-02 | Stop reason: ALTCHOICE

## 2024-01-02 NOTE — DISCHARGE INSTRUCTIONS
1.  Apply Triple Antibiotic Ointment 3-4 times a day.    2.  Keep penis and pop area clean.    3.  Report the following signs or any questions regarding your physical condition to your surgeon immediately:         Excessive swelling of or around the wound area.     Redness.     Temperature of 100 degrees (F) or above.     Excessive pain.     Unable to urinate.    4.  Call your surgeon for any questions regarding your surgery.    5.  Call for an appointment to see your surgeon in  ________2 weeks_________.

## 2024-01-03 NOTE — OP NOTE
[address] [address]                                OPERATIVE REPORT    PATIENT NAME: JON MOORE                   :        1990  MED REC NO:   [id number]                              ROOM:  ACCOUNT NO:   [id number]                           ADMIT DATE: 2024  PROVIDER:     Franky Bella    DATE OF PROCEDURE:  2024    SURGEON:  Dr. Franky Bella.    ASSISTANT:  None.    PREOPERATIVE DIAGNOSIS:  Penile condyloma.    POSTOPERATIVE DIAGNOSIS:  Penile condyloma.    PROCEDURE PERFORMED:  CO2 laser ablation of penile condyloma.    ANESTHESIA:  Local.    COMPLICATIONS:  None.    ESTIMATED BLOOD LOSS:  Minimal.    SPECIMENS:  None.    DISPOSITION:  Stable.    FINDINGS:  1.  An 8-mm lesion near the base of the penis.  2.  Another 2- to 3-mm lesion on the dorsum of the penis near the  coronal sulcus.    INDICATIONS:  The patient is a 33-year-old male with _____ penile  condyloma, here now for definitive therapy.    DESCRIPTION OF PROCEDURE:  The patient was taken back to the operating  room after informed consent including all risks, benefits, and  alternatives were obtained.  The patient remained on his gurney, where  he was prepped and draped in normal sterile fashion.  To begin the case,  he had local anesthetic and placed 1% lidocaine plain in the lesions of  his penis.  Once these were anesthetized, we were able to then use a CO2  laser and ablate these lesions.  We did use 6 bucio continuous.  With  this setting, we ablated the proximal lesion first.  We were then able  to ablate the distal lesion.  We then used the moistened 4x4 and removed  the eschar.  Once this was done, we were able to then re-ablate these  lesions again with the CO2 laser.  Once this was done, we then dressed  the wounds with Bacitracin and he was undraped and returned to the  recovery area by the Nursing teams in satisfactory

## 2024-01-18 ENCOUNTER — OFFICE VISIT (OUTPATIENT)
Dept: UROLOGY | Age: 34
End: 2024-01-18

## 2024-01-18 VITALS
BODY MASS INDEX: 29.29 KG/M2 | DIASTOLIC BLOOD PRESSURE: 72 MMHG | SYSTOLIC BLOOD PRESSURE: 116 MMHG | TEMPERATURE: 97.5 F | HEART RATE: 57 BPM | WEIGHT: 216 LBS

## 2024-01-18 DIAGNOSIS — A63.0 CONDYLOMA: Primary | ICD-10-CM

## 2024-01-18 NOTE — PROGRESS NOTES
History  Referral from KELVIN CARLISLE for penile lesions. He has had these lesions for several years. There is no discharge, itching, pain or change in the lesions.  He has had new lesions develop.  Lesions are only to the shaft of his penis.  He is .  He claims that his wife has the same lesions and her gynecologist said they are nothing to worry about.  He does not know if he was vaccinated against HPV.  He denies any lower urinary tract symptoms.     1/2020 CO2 laser ablation. Pathology: condyloma accuminatum    1/2024 CO2 laser ablation    Today  Here today s/p CO2 laser ablation on 1/2/2024. He denies any pain, fevers, erythema or drainage. He denies any urinary symptoms.    Areas healed without erythema or drainage    Plan  Condyloma is anogenital warts are small, skin-colored or pink growths that form on the vulva (the lips of the vagina), vagina, penis, or anus. They are caused by a virus called human papillomavirus, or \"HPV.\" You can catch this virus during sex. Anogenital warts are often called just \"genital warts.\"  Is HPV dangerous? -- The type of HPV that causes most forms of genital warts is not usually dangerous. But other types of HPV can lead to cancer of the cervix (a part inside the woman's body), penis, or anus.    If warts are going to come back, they will probably do so within a few months. You should know that even if you cannot see any warts, you might still have HPV. That means that warts can come back at any time. If warts do come back, you could spread them to someone else.    Can genital warts be prevented? -- Yes. There are now vaccines against HPV. They can help prevent infection with most of the forms of HPV that cause warts. But the vaccines can prevent infection only if you get vaccinated before you get warts.  You can also protect yourself by not having sex with anyone who is infected with HPV. But that is hard, because people do not always know that they have the virus.

## 2024-03-21 ENCOUNTER — HOSPITAL ENCOUNTER (OUTPATIENT)
Age: 34
Discharge: HOME | End: 2024-03-21
Payer: OTHER GOVERNMENT

## 2024-03-21 DIAGNOSIS — H10.9 BACTERIAL CONJUNCTIVITIS: ICD-10-CM

## 2024-03-21 PROCEDURE — 87591 N.GONORRHOEAE DNA AMP PROB: CPT

## 2024-03-21 PROCEDURE — 87491 CHLMYD TRACH DNA AMP PROBE: CPT

## 2024-03-22 LAB
CHLAMYDIA DNA UR QL NAA+PROBE: NEGATIVE
N GONORRHOEA DNA UR QL NAA+PROBE: NEGATIVE
SPECIMEN DESCRIPTION: NORMAL

## 2024-10-21 ENCOUNTER — HOSPITAL ENCOUNTER (OUTPATIENT)
Dept: GENERAL RADIOLOGY | Age: 34
Discharge: HOME | End: 2024-10-21
Payer: OTHER GOVERNMENT

## 2024-10-21 ENCOUNTER — HOSPITAL ENCOUNTER (OUTPATIENT)
Age: 34
Discharge: HOME | End: 2024-10-21
Payer: OTHER GOVERNMENT

## 2024-10-21 DIAGNOSIS — R05.9 COUGH, UNSPECIFIED TYPE: ICD-10-CM

## 2024-10-21 DIAGNOSIS — R06.2 WHEEZING: ICD-10-CM

## 2024-10-21 PROCEDURE — 71046 X-RAY EXAM CHEST 2 VIEWS: CPT

## 2024-10-30 ENCOUNTER — HOSPITAL ENCOUNTER (OUTPATIENT)
Age: 34
Discharge: HOME | End: 2024-10-30
Payer: OTHER GOVERNMENT

## 2024-10-30 DIAGNOSIS — Z00.00 ROUTINE GENERAL MEDICAL EXAMINATION AT A HEALTH CARE FACILITY: ICD-10-CM

## 2024-10-30 LAB
ALBUMIN SERPL BCG-MCNC: 4.2 G/DL (ref 3.5–5.1)
ALP SERPL-CCNC: 54 U/L (ref 38–126)
ALT SERPL W/O P-5'-P-CCNC: 13 U/L (ref 11–66)
ANION GAP SERPL CALC-SCNC: 12 MEQ/L (ref 8–16)
AST SERPL-CCNC: 15 U/L (ref 5–40)
BILIRUB SERPL-MCNC: 0.4 MG/DL (ref 0.3–1.2)
BUN SERPL-MCNC: 15 MG/DL (ref 7–22)
CALCIUM SERPL-MCNC: 8.7 MG/DL (ref 8.5–10.5)
CHLORIDE SERPL-SCNC: 104 MEQ/L (ref 98–111)
CHOLEST SERPL-MCNC: 171 MG/DL (ref 100–199)
CO2 SERPL-SCNC: 28 MEQ/L (ref 23–33)
CREAT SERPL-MCNC: 1.2 MG/DL (ref 0.4–1.2)
DEPRECATED RDW RBC AUTO: 40.4 FL (ref 35–45)
ERYTHROCYTE [DISTWIDTH] IN BLOOD BY AUTOMATED COUNT: 13.2 % (ref 11.5–14.5)
GFR SERPL CREATININE-BSD FRML MDRD: 81 ML/MIN/1.73M2
GLUCOSE SERPL-MCNC: 91 MG/DL (ref 70–108)
HCT VFR BLD AUTO: 43.7 % (ref 42–52)
HDLC SERPL-MCNC: 39 MG/DL
HGB BLD-MCNC: 14.5 GM/DL (ref 14–18)
LDLC SERPL CALC-MCNC: 108 MG/DL
MCH RBC QN AUTO: 28.2 PG (ref 26–33)
MCHC RBC AUTO-ENTMCNC: 33.2 GM/DL (ref 32.2–35.5)
MCV RBC AUTO: 84.9 FL (ref 80–94)
PLATELET # BLD AUTO: 126 THOU/MM3 (ref 130–400)
PMV BLD AUTO: 12 FL (ref 9.4–12.4)
POTASSIUM SERPL-SCNC: 4.3 MEQ/L (ref 3.5–5.2)
PROT SERPL-MCNC: 7 G/DL (ref 6.1–8)
RBC # BLD AUTO: 5.15 MILL/MM3 (ref 4.7–6.1)
SODIUM SERPL-SCNC: 144 MEQ/L (ref 135–145)
TRIGL SERPL-MCNC: 119 MG/DL (ref 0–199)
WBC # BLD AUTO: 5.5 THOU/MM3 (ref 4.8–10.8)

## 2024-10-30 PROCEDURE — 85027 COMPLETE CBC AUTOMATED: CPT

## 2024-10-30 PROCEDURE — 36415 COLL VENOUS BLD VENIPUNCTURE: CPT

## 2024-10-30 PROCEDURE — 80053 COMPREHEN METABOLIC PANEL: CPT

## 2024-10-30 PROCEDURE — 80061 LIPID PANEL: CPT

## 2024-12-05 ENCOUNTER — HOSPITAL ENCOUNTER (OUTPATIENT)
Age: 34
Discharge: HOME | End: 2024-12-05

## 2024-12-05 DIAGNOSIS — J20.9 ACUTE BRONCHITIS, UNSPECIFIED ORGANISM: ICD-10-CM

## 2024-12-05 LAB — RSV COMMENT: NORMAL

## 2024-12-08 LAB — MISC. #1 REFERENCE GROUP TEST: NORMAL

## 2024-12-12 ENCOUNTER — HOSPITAL ENCOUNTER (OUTPATIENT)
Dept: GENERAL RADIOLOGY | Age: 34
Discharge: HOME | End: 2024-12-12
Payer: OTHER GOVERNMENT

## 2024-12-12 ENCOUNTER — HOSPITAL ENCOUNTER (OUTPATIENT)
Age: 34
Discharge: HOME | End: 2024-12-12
Payer: OTHER GOVERNMENT

## 2024-12-12 DIAGNOSIS — R05.3 COUGH, PERSISTENT: ICD-10-CM

## 2024-12-12 PROCEDURE — 71046 X-RAY EXAM CHEST 2 VIEWS: CPT

## 2024-12-20 ENCOUNTER — HOSPITAL ENCOUNTER (OUTPATIENT)
Dept: PULMONOLOGY | Age: 34
Discharge: HOME | End: 2024-12-20
Payer: OTHER GOVERNMENT

## 2024-12-20 DIAGNOSIS — J20.9 ACUTE BRONCHITIS, UNSPECIFIED ORGANISM: ICD-10-CM

## 2024-12-20 PROCEDURE — 94060 EVALUATION OF WHEEZING: CPT

## 2024-12-20 PROCEDURE — 94726 PLETHYSMOGRAPHY LUNG VOLUMES: CPT

## 2024-12-20 PROCEDURE — 94729 DIFFUSING CAPACITY: CPT

## (undated) DEVICE — Z DISCONTINUED BY MEDLINE USE 2711682 TRAY SKIN PREP DRY W/ PREM GLV

## (undated) DEVICE — NDLCTR: FOAM/MAG 40CT 64/CS: Brand: MEDICAL ACTION INDUSTRIES

## (undated) DEVICE — NEEDLE HYPO 25GA L1.5IN BLU POLYPR HUB S STL REG BVL STR

## (undated) DEVICE — DBD-PACK,BASIC,VI,AURORA: Brand: MEDLINE

## (undated) DEVICE — SOLUTION IRRIG 1000ML STRL H2O USP PLAS POUR BTL

## (undated) DEVICE — TUBING, SUCTION, 9/32" X 12', STRAIGHT: Brand: MEDLINE INDUSTRIES, INC.

## (undated) DEVICE — GOWN,SIRUS,POLYRNF,BRTHSLV,2XL,18/CS: Brand: MEDLINE

## (undated) DEVICE — GLOVE SURG SZ 75 CRM LTX FREE POLYISOPRENE POLYMER BEAD ANTI

## (undated) DEVICE — SOLUTION PREP PAINT POV IOD FOR SKIN MUCOUS MEM

## (undated) DEVICE — GOWN,AURORA,NON-REINFORCED,2XL: Brand: MEDLINE

## (undated) DEVICE — GAUZE,SPONGE,4"X4",16PLY,XRAY,STRL,LF: Brand: MEDLINE

## (undated) DEVICE — COVER LT HNDL BLU PLAS

## (undated) DEVICE — SOLUTION IV IRRIG WATER 1000ML POUR BRL 2F7114

## (undated) DEVICE — CANNULA IV 18GA L15IN BLNT FILL LUERLOCK HUB MJCT

## (undated) DEVICE — SYRINGE MED 10ML LUERLOCK TIP W/O SFTY DISP

## (undated) DEVICE — Z DISCONTINUED USE 2273271 SOLUTION PREP 4OZ 10% POVIDONE IOD BTL FLIP TOP CAP

## (undated) DEVICE — GLOVE ORANGE PI 7 1/2   MSG9075